# Patient Record
Sex: MALE | Race: WHITE | URBAN - METROPOLITAN AREA
[De-identification: names, ages, dates, MRNs, and addresses within clinical notes are randomized per-mention and may not be internally consistent; named-entity substitution may affect disease eponyms.]

---

## 2021-01-13 ENCOUNTER — OFFICE VISIT (OUTPATIENT)
Dept: SURGERY | Facility: CLINIC | Age: 61
End: 2021-01-13
Payer: COMMERCIAL

## 2021-01-13 VITALS
OXYGEN SATURATION: 98 % | HEART RATE: 65 BPM | SYSTOLIC BLOOD PRESSURE: 122 MMHG | HEIGHT: 69 IN | WEIGHT: 193 LBS | BODY MASS INDEX: 28.58 KG/M2 | DIASTOLIC BLOOD PRESSURE: 64 MMHG

## 2021-01-13 DIAGNOSIS — L08.9 INFECTED CYST OF SKIN: Primary | ICD-10-CM

## 2021-01-13 DIAGNOSIS — L72.9 INFECTED CYST OF SKIN: Primary | ICD-10-CM

## 2021-01-13 PROCEDURE — 99242 OFF/OP CONSLTJ NEW/EST SF 20: CPT | Performed by: SURGERY

## 2021-01-13 RX ORDER — ANASTROZOLE 1 MG/1
1 TABLET ORAL DAILY
COMMUNITY
Start: 2020-12-07

## 2021-01-13 RX ORDER — ALLOPURINOL 100 MG/1
100 TABLET ORAL DAILY
COMMUNITY
Start: 2020-12-07

## 2021-01-13 RX ORDER — TESTOSTERONE CYPIONATE 200 MG/ML
INJECTION, SOLUTION INTRAMUSCULAR
COMMUNITY
Start: 2020-12-14

## 2021-01-13 RX ORDER — CEPHALEXIN 500 MG/1
500 CAPSULE ORAL 3 TIMES DAILY
Qty: 30 CAPSULE | Refills: 0 | Status: SHIPPED | OUTPATIENT
Start: 2021-01-13 | End: 2021-01-23

## 2021-01-13 RX ORDER — FINASTERIDE 5 MG/1
TABLET, FILM COATED ORAL
COMMUNITY
Start: 2020-12-07

## 2021-01-13 ASSESSMENT — MIFFLIN-ST. JEOR: SCORE: 1680.82

## 2021-01-16 NOTE — PROGRESS NOTES
[facility] Consultation    CHIEF COMPLAINT:  Chief Complaint   Patient presents with     Consult     skin cyst on buttocks       HISTORY OF PRESENT ILLNESS:  Primo Anaya is a 60 year old male who is seen in consultation at the request of Dr. Oh for evaluation of enlarging and bothersome left buttock subcutaneous mass.  For some time Mr. Anaya has been aware of a small nodularity in the area of concern.  Recently after doing some particularly strenuous activity he noticed that area enlarge and divided into 2 palpable lumps.  He has not had any drainage, fevers, other areas of concern or previous episodes like this.    REVIEW OF SYSTEMS:  Constitutional:  Negative for chills, fatigue, fever and weight change.  Neuro: No extremity, nor facial weakness  Psych:  No unexpected changes in mood  Eyes:  Negative for new vision problems.  ENT:  Negative for ENT pain.  Cardiovascular:  Negative for chest pain, palpitations.  Respiratory:  Negative for cough, dyspnea.  Gastrointestinal:  Negative for abdominal pain.     Musculoskeletal:  Negative for new arthralgias or myalgias.  Integumentary: See HPI    Past medical and surgical history are noncontributory    Family History has been reviewed.    Social History     Tobacco Use     Smoking status: Never Smoker     Smokeless tobacco: Never Used   Substance Use Topics     Alcohol use: Not on file         No Known Allergies    Current Outpatient Medications   Medication Sig Dispense Refill     allopurinol (ZYLOPRIM) 100 MG tablet Take 100 mg by mouth daily       anastrozole (ARIMIDEX) 1 MG tablet Take 1 mg by mouth daily       cephALEXin (KEFLEX) 500 MG capsule Take 1 capsule (500 mg) by mouth 3 times daily for 10 days 30 capsule 0     finasteride (PROSCAR) 5 MG tablet TAKE 1 3 (ONE THIRD) TABLET BY MOUTH ONCE DAILY       testosterone cypionate (DEPOTESTOSTERONE) 200 MG/ML injection INJECT 0.5ML INTRAMUSCULAR EVERY OTHER DAY.         Vitals: BP  "122/64 (BP Location: Left arm, Patient Position: Sitting, Cuff Size: Adult Regular)   Pulse 65   Ht 1.753 m (5' 9\")   Wt 87.5 kg (193 lb)   SpO2 98%   BMI 28.50 kg/m    BMI= Body mass index is 28.5 kg/m .    EXAM:  GENERAL: healthy, alert and no distress     HEENT: moist mucus membranes, no scleral icterus,   CARDIOVASCULAR:  RRR, No JVD  NEURO:  Alert;  well oriented to time, place and person.  RESPIRATORY: non labored breathing  NECK: Neck supple. No noticeable masses.  No lymphadenopathy noted.  EXTREMITIES: warm and well perfused, no edema  SKIN: On the medial aspect of the distal buttock there is palpable subcutaneous induration, dumbbell-shaped.  This measures about 2 x 3 and 1 x 2 cm    LABS/Imaging: None    ASSESSMENT:  Primo Anaya suffers from 1. Infected cyst of skin    - cephALEXin (KEFLEX) 500 MG capsule; Take 1 capsule (500 mg) by mouth 3 times daily for 10 days  Dispense: 30 capsule; Refill: 0      PLAN:  We will treat with antibiotics to improve level of inflammation.  After we will discuss if excision or observation is the route he would like to follow.    All his questions answered.    It is my pleasure to participate in the care of Primo Anaya. Thank you for this consultation.     If you have any questions please give me a call.    Best regards,  Néstor Grissom MD    Please route or send letter to:  Primary Care Provider (PCP), Referring Provider and Include Progress Note    Total time with patient visit: 25 minutes more than half spent in counseling, explanation of procedures and coordination of care.    "